# Patient Record
Sex: MALE | Race: WHITE
[De-identification: names, ages, dates, MRNs, and addresses within clinical notes are randomized per-mention and may not be internally consistent; named-entity substitution may affect disease eponyms.]

---

## 2020-02-05 ENCOUNTER — HOSPITAL ENCOUNTER (OUTPATIENT)
Dept: HOSPITAL 62 - OD | Age: 25
End: 2020-02-05
Attending: PHYSICIAN ASSISTANT
Payer: COMMERCIAL

## 2020-02-05 DIAGNOSIS — J45.21: Primary | ICD-10-CM

## 2020-02-05 DIAGNOSIS — R06.02: ICD-10-CM

## 2020-02-05 LAB
ADD MANUAL DIFF: NO
ALBUMIN SERPL-MCNC: 4.6 G/DL (ref 3.5–5)
ALP SERPL-CCNC: 56 U/L (ref 38–126)
ANION GAP SERPL CALC-SCNC: 14 MMOL/L (ref 5–19)
AST SERPL-CCNC: 23 U/L (ref 17–59)
BASOPHILS # BLD AUTO: 0.1 10^3/UL (ref 0–0.2)
BASOPHILS NFR BLD AUTO: 0.6 % (ref 0–2)
BILIRUB DIRECT SERPL-MCNC: 0.3 MG/DL (ref 0–0.4)
BILIRUB SERPL-MCNC: 1 MG/DL (ref 0.2–1.3)
BUN SERPL-MCNC: 15 MG/DL (ref 7–20)
CALCIUM: 9.3 MG/DL (ref 8.4–10.2)
CHLORIDE SERPL-SCNC: 94 MMOL/L (ref 98–107)
CO2 SERPL-SCNC: 27 MMOL/L (ref 22–30)
CRP SERPL-MCNC: 84.1 MG/L (ref ?–10)
EOSINOPHIL # BLD AUTO: 0 10^3/UL (ref 0–0.6)
EOSINOPHIL NFR BLD AUTO: 0 % (ref 0–6)
ERYTHROCYTE [DISTWIDTH] IN BLOOD BY AUTOMATED COUNT: 12.7 % (ref 11.5–14)
ERYTHROCYTE [SEDIMENTATION RATE] IN BLOOD: 19 MM/HR (ref 0–15)
GLUCOSE SERPL-MCNC: 117 MG/DL (ref 75–110)
HCT VFR BLD CALC: 45.5 % (ref 37.9–51)
HGB BLD-MCNC: 15.7 G/DL (ref 13.5–17)
LYMPHOCYTES # BLD AUTO: 0.5 10^3/UL (ref 0.5–4.7)
LYMPHOCYTES NFR BLD AUTO: 5.3 % (ref 13–45)
MCH RBC QN AUTO: 31 PG (ref 27–33.4)
MCHC RBC AUTO-ENTMCNC: 34.5 G/DL (ref 32–36)
MCV RBC AUTO: 90 FL (ref 80–97)
MONOCYTES # BLD AUTO: 1 10^3/UL (ref 0.1–1.4)
MONOCYTES NFR BLD AUTO: 10.6 % (ref 3–13)
NEUTROPHILS # BLD AUTO: 7.7 10^3/UL (ref 1.7–8.2)
NEUTS SEG NFR BLD AUTO: 83.5 % (ref 42–78)
PLATELET # BLD: 229 10^3/UL (ref 150–450)
POTASSIUM SERPL-SCNC: 4.5 MMOL/L (ref 3.6–5)
PROT SERPL-MCNC: 8 G/DL (ref 6.3–8.2)
RBC # BLD AUTO: 5.07 10^6/UL (ref 4.35–5.55)
TOTAL CELLS COUNTED % (AUTO): 100 %
WBC # BLD AUTO: 9.2 10^3/UL (ref 4–10.5)

## 2020-02-05 PROCEDURE — 85025 COMPLETE CBC W/AUTO DIFF WBC: CPT

## 2020-02-05 PROCEDURE — 71046 X-RAY EXAM CHEST 2 VIEWS: CPT

## 2020-02-05 PROCEDURE — 86140 C-REACTIVE PROTEIN: CPT

## 2020-02-05 PROCEDURE — 36415 COLL VENOUS BLD VENIPUNCTURE: CPT

## 2020-02-05 PROCEDURE — 80053 COMPREHEN METABOLIC PANEL: CPT

## 2020-02-05 PROCEDURE — 85652 RBC SED RATE AUTOMATED: CPT

## 2020-02-05 NOTE — RADIOLOGY REPORT (SQ)
EXAM DESCRIPTION:  CHEST PA/LATERAL



COMPLETED DATE/TIME:  2/5/2020 11:28 am



REASON FOR STUDY:  SHORTNESS OF BREATH MILD INTERMITTENT ASTHMA WITH (ACUTE) EXACERBATION



COMPARISON:  None.



EXAM PARAMETERS:  NUMBER OF VIEWS: two views

TECHNIQUE: Digital Frontal and Lateral radiographic views of the chest acquired.

RADIATION DOSE: NA

LIMITATIONS: none



FINDINGS:  LUNGS AND PLEURA: Ill-defined opacification in the right base and ill-defined opacificatio
n posteriorly and inferiorly on the lateral view.

MEDIASTINUM AND HILAR STRUCTURES: No masses or contour abnormalities.

HEART AND VASCULAR STRUCTURES: Heart normal size.  No evidence for failure.

BONES: No acute findings.

HARDWARE: None in the chest.

OTHER: No other significant finding.



IMPRESSION:  Cannot exclude limited right lower lobe pneumonia.



TECHNICAL DOCUMENTATION:  JOB ID:  [id number]

 2011 [employer]- All Rights Reserved



Reading location - IP/workstation name: [email or web address]

## 2020-02-28 ENCOUNTER — HOSPITAL ENCOUNTER (OUTPATIENT)
Dept: HOSPITAL 62 - OD | Age: 25
End: 2020-02-28
Attending: PHYSICIAN ASSISTANT
Payer: COMMERCIAL

## 2020-02-28 DIAGNOSIS — J18.9: Primary | ICD-10-CM

## 2020-02-28 PROCEDURE — 71046 X-RAY EXAM CHEST 2 VIEWS: CPT

## 2020-02-28 NOTE — RADIOLOGY REPORT (SQ)
EXAM DESCRIPTION:  CHEST PA/LATERAL



COMPLETED DATE/TIME:  2/28/2020 3:31 pm



REASON FOR STUDY:  PNEUMONIA, UNSPECIFIED ORGANISM



COMPARISON:  2/5/2020



EXAM PARAMETERS:  NUMBER OF VIEWS: two views

TECHNIQUE: Digital Frontal and Lateral radiographic views of the chest acquired.

RADIATION DOSE: NA

LIMITATIONS: none



FINDINGS:  LUNGS AND PLEURA: No opacities, masses or pneumothorax. No pleural effusion.

MEDIASTINUM AND HILAR STRUCTURES: No masses or contour abnormalities.

HEART AND VASCULAR STRUCTURES: Heart normal size.  No evidence for failure.

BONES: No acute findings.

HARDWARE: None in the chest.

OTHER: No other significant finding.



IMPRESSION:  NO SIGNIFICANT RADIOGRAPHIC FINDING IN THE CHEST.



TECHNICAL DOCUMENTATION:  JOB ID:  [id number]

 2011 [employer]- All Rights Reserved



Reading location - IP/workstation name: [email or web address]